# Patient Record
Sex: MALE | Race: WHITE | NOT HISPANIC OR LATINO | Employment: OTHER | URBAN - METROPOLITAN AREA
[De-identification: names, ages, dates, MRNs, and addresses within clinical notes are randomized per-mention and may not be internally consistent; named-entity substitution may affect disease eponyms.]

---

## 2023-05-02 ENCOUNTER — APPOINTMENT (OUTPATIENT)
Dept: LAB | Facility: LAB | Age: 73
End: 2023-05-02
Payer: MEDICARE

## 2023-05-02 LAB
ALBUMIN (G/DL) IN SER/PLAS: 4.4 G/DL (ref 3.4–5)
ANION GAP IN SER/PLAS: 13 MMOL/L (ref 10–20)
CALCIUM (MG/DL) IN SER/PLAS: 9.4 MG/DL (ref 8.6–10.6)
CARBON DIOXIDE, TOTAL (MMOL/L) IN SER/PLAS: 27 MMOL/L (ref 21–32)
CHLORIDE (MMOL/L) IN SER/PLAS: 107 MMOL/L (ref 98–107)
CREATININE (MG/DL) IN SER/PLAS: 2.38 MG/DL (ref 0.5–1.3)
GFR MALE: 28 ML/MIN/1.73M2
GLUCOSE (MG/DL) IN SER/PLAS: 93 MG/DL (ref 74–99)
PHOSPHATE (MG/DL) IN SER/PLAS: 4.1 MG/DL (ref 2.5–4.9)
POTASSIUM (MMOL/L) IN SER/PLAS: 5.5 MMOL/L (ref 3.5–5.3)
SODIUM (MMOL/L) IN SER/PLAS: 141 MMOL/L (ref 136–145)
UREA NITROGEN (MG/DL) IN SER/PLAS: 55 MG/DL (ref 6–23)

## 2023-08-01 ENCOUNTER — APPOINTMENT (OUTPATIENT)
Dept: LAB | Facility: LAB | Age: 73
End: 2023-08-01
Payer: MEDICARE

## 2023-08-01 LAB
ALBUMIN (G/DL) IN SER/PLAS: 4.6 G/DL (ref 3.4–5)
ANION GAP IN SER/PLAS: 13 MMOL/L (ref 10–20)
CALCIUM (MG/DL) IN SER/PLAS: 9.5 MG/DL (ref 8.6–10.6)
CARBON DIOXIDE, TOTAL (MMOL/L) IN SER/PLAS: 25 MMOL/L (ref 21–32)
CHLORIDE (MMOL/L) IN SER/PLAS: 106 MMOL/L (ref 98–107)
CREATININE (MG/DL) IN SER/PLAS: 2.09 MG/DL (ref 0.5–1.3)
ERYTHROCYTE DISTRIBUTION WIDTH (RATIO) BY AUTOMATED COUNT: 12.9 % (ref 11.5–14.5)
ERYTHROCYTE MEAN CORPUSCULAR HEMOGLOBIN CONCENTRATION (G/DL) BY AUTOMATED: 32.4 G/DL (ref 32–36)
ERYTHROCYTE MEAN CORPUSCULAR VOLUME (FL) BY AUTOMATED COUNT: 94 FL (ref 80–100)
ERYTHROCYTES (10*6/UL) IN BLOOD BY AUTOMATED COUNT: 4.55 X10E12/L (ref 4.5–5.9)
GFR MALE: 32 ML/MIN/1.73M2
GLUCOSE (MG/DL) IN SER/PLAS: 95 MG/DL (ref 74–99)
HEMATOCRIT (%) IN BLOOD BY AUTOMATED COUNT: 42.9 % (ref 41–52)
HEMOGLOBIN (G/DL) IN BLOOD: 13.9 G/DL (ref 13.5–17.5)
LEUKOCYTES (10*3/UL) IN BLOOD BY AUTOMATED COUNT: 6 X10E9/L (ref 4.4–11.3)
NRBC (PER 100 WBCS) BY AUTOMATED COUNT: 0 /100 WBC (ref 0–0)
PHOSPHATE (MG/DL) IN SER/PLAS: 4.8 MG/DL (ref 2.5–4.9)
PLATELETS (10*3/UL) IN BLOOD AUTOMATED COUNT: 195 X10E9/L (ref 150–450)
POTASSIUM (MMOL/L) IN SER/PLAS: 5.2 MMOL/L (ref 3.5–5.3)
SODIUM (MMOL/L) IN SER/PLAS: 139 MMOL/L (ref 136–145)
UREA NITROGEN (MG/DL) IN SER/PLAS: 60 MG/DL (ref 6–23)

## 2023-08-07 LAB
AMMONIUM, 24 HR, U(MAYO): 57 MMOL/24 H (ref 15–56)
BRUSHITE CRYSTAL(MAYO): -4.95 DG
CALCIUM OXALATE CRYSTAL(MAYO): 0.09 DG
CALCIUM, 24 HR, U(MAYO): 83 MG/24 H
CHLORIDE, U(MAYO): 200 MMOL/24 H (ref 34–286)
CITRATE EXCRETION, U(MAYO): 150 MG/24 H
COLLECTION DURATION(MAYO): 24 H
CREATININE, U(MAYO): 1958 MG/24 H (ref 930–2955)
HEIGHT (CM)(MAYO): 0 CM
HYDROXYAPATITE CRYSTAL(MAYO): -2.93 DG
INTERPRETATION(MAYO): ABNORMAL
MAGNESIUM, 24 HR, U(MAYO): 167 MG/24 H (ref 51–269)
OSMOLALITY(MAYO): 442 MOSM/KG (ref 150–1150)
OXALATE, MG/24 H(MAYO): 59 MG/24 H (ref 9.7–40.5)
OXALATE, U (MMOL/24 H)(MAYO): 0.67 MMOL/24 H (ref 0.11–0.46)
PATIENT SURFACE AREA(MAYO): ABNORMAL 1.73M(2)
PH, U(MAYO): 5 (ref 4.5–8)
PHOSPHORUS, U(MAYO): 1500 MG/24 H (ref 226–1797)
POTASSIUM, U(MAYO): 133 MMOL/24 H (ref 16–105)
PROTEIN CATABOLIC RATE, U(MAYO): 188 G/24 H (ref 56–125)
SODIUM, U(MAYO): 217 MMOL/24 H (ref 22–328)
SULFATE, 24 HR, U(MAYO): 44 MMOL/24 H (ref 7–47)
UREA NITROGEN, U(MAYO): 26 G/24 H (ref 7–42)
URIC ACID CRYSTAL(MAYO): -0.19 DG
URIC ACID, U(MAYO): 250 MG/24 H (ref 200–1000)
VOLUME(MAYO): 4167 ML
WEIGHT (KG)(MAYO): 0 KG

## 2023-11-03 ENCOUNTER — LAB (OUTPATIENT)
Dept: LAB | Facility: LAB | Age: 73
End: 2023-11-03
Payer: MEDICARE

## 2023-11-03 DIAGNOSIS — N18.4 CHRONIC KIDNEY DISEASE, STAGE 4 (SEVERE) (MULTI): Primary | ICD-10-CM

## 2023-11-03 LAB
ALBUMIN SERPL BCP-MCNC: 4.2 G/DL (ref 3.4–5)
ANION GAP SERPL CALC-SCNC: 12 MMOL/L (ref 10–20)
BUN SERPL-MCNC: 64 MG/DL (ref 6–23)
CALCIUM SERPL-MCNC: 9.7 MG/DL (ref 8.6–10.6)
CHLORIDE SERPL-SCNC: 106 MMOL/L (ref 98–107)
CO2 SERPL-SCNC: 28 MMOL/L (ref 21–32)
CREAT SERPL-MCNC: 2.17 MG/DL (ref 0.5–1.3)
CREAT UR-MCNC: 48.5 MG/DL (ref 20–370)
GFR SERPL CREATININE-BSD FRML MDRD: 31 ML/MIN/1.73M*2
GLUCOSE SERPL-MCNC: 99 MG/DL (ref 74–99)
MICROALBUMIN UR-MCNC: <7 MG/L
MICROALBUMIN/CREAT UR: NORMAL MG/G{CREAT}
PHOSPHATE SERPL-MCNC: 4 MG/DL (ref 2.5–4.9)
POTASSIUM SERPL-SCNC: 5.3 MMOL/L (ref 3.5–5.3)
SODIUM SERPL-SCNC: 141 MMOL/L (ref 136–145)

## 2023-11-03 PROCEDURE — 80069 RENAL FUNCTION PANEL: CPT

## 2023-11-03 PROCEDURE — 82570 ASSAY OF URINE CREATININE: CPT

## 2023-11-03 PROCEDURE — 82043 UR ALBUMIN QUANTITATIVE: CPT

## 2023-11-03 PROCEDURE — 36415 COLL VENOUS BLD VENIPUNCTURE: CPT

## 2024-02-02 ENCOUNTER — APPOINTMENT (OUTPATIENT)
Dept: LAB | Facility: LAB | Age: 74
End: 2024-02-02
Payer: MEDICARE

## 2024-02-02 ENCOUNTER — LAB (OUTPATIENT)
Dept: LAB | Facility: LAB | Age: 74
End: 2024-02-02
Payer: MEDICARE

## 2024-02-02 DIAGNOSIS — N18.4 CHRONIC KIDNEY DISEASE, STAGE 4 (SEVERE) (MULTI): ICD-10-CM

## 2024-02-02 DIAGNOSIS — N18.4 CHRONIC KIDNEY DISEASE, STAGE 4 (SEVERE) (MULTI): Primary | ICD-10-CM

## 2024-02-02 LAB
ALBUMIN SERPL BCP-MCNC: 4.3 G/DL (ref 3.4–5)
ANION GAP SERPL CALC-SCNC: 14 MMOL/L (ref 10–20)
BUN SERPL-MCNC: 58 MG/DL (ref 6–23)
CALCIUM SERPL-MCNC: 9.4 MG/DL (ref 8.6–10.6)
CHLORIDE SERPL-SCNC: 107 MMOL/L (ref 98–107)
CO2 SERPL-SCNC: 24 MMOL/L (ref 21–32)
CREAT SERPL-MCNC: 2.17 MG/DL (ref 0.5–1.3)
EGFRCR SERPLBLD CKD-EPI 2021: 31 ML/MIN/1.73M*2
GLUCOSE SERPL-MCNC: 117 MG/DL (ref 74–99)
PHOSPHATE SERPL-MCNC: 3.4 MG/DL (ref 2.5–4.9)
POTASSIUM SERPL-SCNC: 4.4 MMOL/L (ref 3.5–5.3)
SODIUM SERPL-SCNC: 141 MMOL/L (ref 136–145)

## 2024-02-02 PROCEDURE — 84392 ASSAY OF URINE SULFATE: CPT

## 2024-02-02 PROCEDURE — 84540 ASSAY OF URINE/UREA-N: CPT

## 2024-02-02 PROCEDURE — 82507 ASSAY OF CITRATE: CPT

## 2024-02-02 PROCEDURE — 84560 ASSAY OF URINE/URIC ACID: CPT

## 2024-02-02 PROCEDURE — 83945 ASSAY OF OXALATE: CPT

## 2024-02-02 PROCEDURE — 82436 ASSAY OF URINE CHLORIDE: CPT

## 2024-02-02 PROCEDURE — 84300 ASSAY OF URINE SODIUM: CPT

## 2024-02-02 PROCEDURE — 82340 ASSAY OF CALCIUM IN URINE: CPT

## 2024-02-02 PROCEDURE — 82570 ASSAY OF URINE CREATININE: CPT

## 2024-02-02 PROCEDURE — 83735 ASSAY OF MAGNESIUM: CPT

## 2024-02-02 PROCEDURE — 82140 ASSAY OF AMMONIA: CPT

## 2024-02-02 PROCEDURE — 80069 RENAL FUNCTION PANEL: CPT

## 2024-02-02 PROCEDURE — 84133 ASSAY OF URINE POTASSIUM: CPT

## 2024-02-02 PROCEDURE — 83986 ASSAY PH BODY FLUID NOS: CPT

## 2024-02-02 PROCEDURE — 83935 ASSAY OF URINE OSMOLALITY: CPT

## 2024-02-02 PROCEDURE — 36415 COLL VENOUS BLD VENIPUNCTURE: CPT

## 2024-02-08 LAB
AMMONIA 24H UR-SRATE: 35 MMOL/24 H (ref 15–56)
BRUSHITE CRYSTAL(MAYO): -4.45 DG
BSA: ABNORMAL 1.73M(2)
CALCIUM 24H UR-MRATE: 80 MG/24 H
CAOX 24H ENGDIFF UR: -0.54 DG
CHLORIDE 24H UR-SRATE: 156 MMOL/24 H (ref 34–286)
CITRATE 24H UR-MRATE: 204 MG/24 H
COLLECT DURATION TIME UR: 24 H
CREAT 24H UR-MRATE: 1560 MG/24 H (ref 930–2955)
HYDROXYAPATITE 24H ENGDIFF UR: -2.03 DG
MAGNESIUM 24H UR-MRATE: 160 MG/24 H (ref 51–269)
OSMOLALITY 24H UR: 429 MOSM/KG (ref 150–1150)
OXALATE 24H UR-MRATE: 31.7 MG/24 H (ref 9.7–40.5)
OXALATE 24H UR-SRATE: 0.36 MMOL/24 H (ref 0.11–0.46)
PH 24H UR: 5.2 [PH] (ref 4.5–8)
PHOSPHATE 24H UR-MRATE: 1280 MG/24 H (ref 226–1797)
POTASSIUM 24H UR-SRATE: 140 MMOL/24 H (ref 16–105)
PROTEIN CATABOLIC RATE 24H UR-MRATE: 180 G/24 H (ref 56–125)
SODIUM 24H UR-SRATE: 180 MMOL/24 H (ref 22–328)
SPECIMEN VOL 24H UR: 4000 ML
SULFATE 24H UR-SRATE: 37 MMOL/24 H (ref 7–47)
URATE 24H UR-MRATE: 320 MG/24 H (ref 200–1000)
URIC ACID CRYSTAL(MAYO): 0.12 DG
URINALYSIS SPECIALIST REVIEW: ABNORMAL
UUN 24H UR-MRATE: 24.8 G/24 H (ref 7–42)

## 2024-02-15 ENCOUNTER — OFFICE VISIT (OUTPATIENT)
Dept: NEPHROLOGY | Facility: CLINIC | Age: 74
End: 2024-02-15
Payer: MEDICARE

## 2024-02-15 VITALS
HEIGHT: 71 IN | SYSTOLIC BLOOD PRESSURE: 146 MMHG | HEART RATE: 66 BPM | BODY MASS INDEX: 24.5 KG/M2 | DIASTOLIC BLOOD PRESSURE: 73 MMHG | WEIGHT: 175 LBS

## 2024-02-15 DIAGNOSIS — N20.0 NEPHROLITHIASIS: ICD-10-CM

## 2024-02-15 DIAGNOSIS — N18.32 STAGE 3B CHRONIC KIDNEY DISEASE (MULTI): Primary | ICD-10-CM

## 2024-02-15 PROCEDURE — 1159F MED LIST DOCD IN RCRD: CPT | Performed by: INTERNAL MEDICINE

## 2024-02-15 PROCEDURE — 99214 OFFICE O/P EST MOD 30 MIN: CPT | Performed by: INTERNAL MEDICINE

## 2024-02-15 PROCEDURE — 1126F AMNT PAIN NOTED NONE PRSNT: CPT | Performed by: INTERNAL MEDICINE

## 2024-02-15 RX ORDER — COLD-HOT PACK
1 EACH MISCELLANEOUS 2 TIMES DAILY
COMMUNITY

## 2024-02-15 RX ORDER — DAPAGLIFLOZIN 5 MG/1
5 TABLET, FILM COATED ORAL DAILY
COMMUNITY

## 2024-02-15 RX ORDER — CHOLECALCIFEROL (VITAMIN D3) 25 MCG
1 TABLET ORAL DAILY
COMMUNITY

## 2024-02-15 RX ORDER — PRAVASTATIN SODIUM 40 MG/1
40 TABLET ORAL NIGHTLY
COMMUNITY
Start: 2020-09-02

## 2024-02-15 RX ORDER — LOSARTAN POTASSIUM 25 MG/1
25 TABLET ORAL DAILY
COMMUNITY

## 2024-02-15 RX ORDER — TAMSULOSIN HYDROCHLORIDE 0.4 MG/1
0.4 CAPSULE ORAL DAILY
COMMUNITY
Start: 2020-09-02

## 2024-02-15 ASSESSMENT — ENCOUNTER SYMPTOMS
OCCASIONAL FEELINGS OF UNSTEADINESS: 0
DEPRESSION: 0
LOSS OF SENSATION IN FEET: 1

## 2024-02-15 ASSESSMENT — PAIN SCALES - GENERAL: PAINLEVEL: 0-NO PAIN

## 2024-02-15 NOTE — PROGRESS NOTES
" Leonel Mendez is a 73 y.o. male here in follow up for CKD    Doing well. Checks home BP periodically and gets good readings. Sees Dr. Amos every 3 months or so. Just completed another 24 h urine supersaturation which looks excellent, and we reviewed in the office today.  Specifically he denies dysuria, hematuria, flank pain, fevers,  leg swelling. His medications remain unchanged. BP here today higher than in PCP's office, for unclear reasons. We discussed checking it at home consistently for a few days, and if above goal will increase Farxiga to 10 mg daily (from current 5 mg daily).     ROS  All the rest reviewed and negative    Objective     Vital signs    /73 (BP Location: Right arm, Patient Position: Sitting, BP Cuff Size: Adult)   Pulse 66   Ht 1.803 m (5' 11\")   Wt 79.4 kg (175 lb)   BMI 24.41 kg/m²     Physical Exam  Constitutional:  well appearing, in NAD  Psychiatric:  appropriate mood and behavior    HEENT: NC/AT, clear sclerae, moist mucous membranes  Cardiovascular: normal S1, S2, RRR,  no murmurs, gallop or rubs  Pulmonary:  Normal breath sounds  Extremities: no edema  Skin:  warm and dry    Meds    Current Outpatient Medications:     aspirin 81 mg capsule, Take 81 mg by mouth once daily., Disp: , Rfl:     cholecalciferol (Vitamin D-3) 25 MCG (1000 UT) tablet, Take 1 tablet (25 mcg) by mouth once daily., Disp: , Rfl:     cranberry conc-C-bacillus coag (Cranberry Urinary Tract Health) 250-30-3.5 mg tablet, Take 1 tablet by mouth 2 times a day., Disp: , Rfl:     Farxiga 5 mg, Take 1 tablet (5 mg) by mouth once daily., Disp: , Rfl:     losartan (Cozaar) 25 mg tablet, Take 1 tablet (25 mg) by mouth once daily., Disp: , Rfl:     pravastatin (Pravachol) 40 mg tablet, Take 1 tablet (40 mg) by mouth once daily at bedtime., Disp: , Rfl:     tamsulosin (Flomax) 0.4 mg 24 hr capsule, Take 1 capsule (0.4 mg) by mouth once daily., Disp: , Rfl:      Allergies  Allergies   Allergen Reactions    Erythromycin " Hives    Pollen Extracts Unknown        Results  Component      Latest Ref Rng 11/7/2022 2/7/2023 8/1/2023 2/2/2024   Volume      mL 3,801.00  4,001.00  4,167.00  4000    Sodium, U      22 - 328 mmol/24 h 144  176  217  180    Potassium, U      16 - 105 mmol/24 h 103  132 (H)  133 (H)  140 (H)    Calcium, 24 Hr, U      <250 mg/24 h <38  <40  83  80    Citrate Excretion, U      mg/24 h 198  172  150  204    Oxalate, mg/24 h      9.7 - 40.5 mg/24 h 40.5  56.3 (H)  59.0 (H)  31.7    pH, U      4.5 - 8.0  5.2  5.2  5.0  5.2    Uric Acid, U      200 - 1000 mg/24 h 456  360  250  320    Creatinine, U      930 - 2955 mg/24 h 1,558  1,480  1,958  1560      Component      Latest Ref Rng 2/7/2023 5/2/2023 8/1/2023 11/3/2023 2/2/2024   Blood Urea Nitrogen      6 - 23 mg/dL 64 (H)  55 (H)  60 (H)  64 (H)  58 (H)    Creatinine      0.50 - 1.30 mg/dL 2.14 (H)  2.38 (H)  2.09 (H)  2.17 (H)  2.17 (H)       Imaging results  No results found.     Assessment and Plan  1. CKD stage G4/A1, presumed to represent past obstructive nephropathy +/- hyperoxaluria. Diabetic kidney disease unlikely. With stable/mildly improved s.creatinine and in the absence of proteinuria we're withholding renal biopsy. Labs this month stable. Will decrease frequency of blood and urine tests to every 6 months.     2. H/o bladder stones. Recently reported passing small amount of gravel in the urine. This month's supersaturation reveals much improved oxalate excretion and slight ly better citrate . Continue large amount of fluid intake, crystal light lemonade as able, dietary restrictions as you are doing. Repeat supersaturation in August.     3. HTN - BP above goal in the office today again. Continue current antihypertensive regimen, but check home BP am and pm for 5-7 days and report readings to me or Dr. Amos. If SBP> 130 would like to increase Farxiga to 10 mg daily (recently reported positive results on rx of nephrolithiasis as well).     RTC in f/u in 6  months.    Minerva Martin MD

## 2024-02-15 NOTE — PATIENT INSTRUCTIONS
Continue medications as you are taking  Try to exercise or at least walk regularly, as able    The blood pressure goal for kidney patients is 130s/70-80s or less.  Good blood pressure control at home is very important for your health.  Therefore please check home blood pressure morning and evening 5-7 days and send measurements to us one of the following ways:  - My chart  - Email: [email or web address]  - Phone  or  - Fax     Avoid kidney toxic medications, like nonsteroidal anti-inflammatories (NSAIDs); Tylenol or acetaminophen over the counter is okay    See you back in follow up in 6 months  Please obtain lab test a few days before the next visit.

## 2024-08-29 ENCOUNTER — APPOINTMENT (OUTPATIENT)
Dept: PRIMARY CARE | Facility: CLINIC | Age: 74
End: 2024-08-29
Payer: MEDICARE

## 2024-08-29 VITALS
BODY MASS INDEX: 24.53 KG/M2 | HEART RATE: 73 BPM | TEMPERATURE: 97.5 F | OXYGEN SATURATION: 100 % | HEIGHT: 71 IN | WEIGHT: 175.2 LBS | DIASTOLIC BLOOD PRESSURE: 80 MMHG | SYSTOLIC BLOOD PRESSURE: 120 MMHG

## 2024-08-29 DIAGNOSIS — I10 BENIGN ESSENTIAL HYPERTENSION: ICD-10-CM

## 2024-08-29 DIAGNOSIS — E72.53: ICD-10-CM

## 2024-08-29 DIAGNOSIS — E11.65 TYPE 2 DIABETES MELLITUS WITH HYPERGLYCEMIA, UNSPECIFIED WHETHER LONG TERM INSULIN USE (MULTI): Primary | ICD-10-CM

## 2024-08-29 DIAGNOSIS — N18.4 CKD (CHRONIC KIDNEY DISEASE) STAGE 4, GFR 15-29 ML/MIN (MULTI): ICD-10-CM

## 2024-08-29 LAB — POC HEMOGLOBIN A1C: 6.3 % (ref 4.2–6.5)

## 2024-08-29 PROCEDURE — 1160F RVW MEDS BY RX/DR IN RCRD: CPT | Performed by: FAMILY MEDICINE

## 2024-08-29 PROCEDURE — 99203 OFFICE O/P NEW LOW 30 MIN: CPT | Performed by: FAMILY MEDICINE

## 2024-08-29 PROCEDURE — 83036 HEMOGLOBIN GLYCOSYLATED A1C: CPT | Performed by: FAMILY MEDICINE

## 2024-08-29 PROCEDURE — 1159F MED LIST DOCD IN RCRD: CPT | Performed by: FAMILY MEDICINE

## 2024-08-29 PROCEDURE — 1158F ADVNC CARE PLAN TLK DOCD: CPT | Performed by: FAMILY MEDICINE

## 2024-08-29 PROCEDURE — 3074F SYST BP LT 130 MM HG: CPT | Performed by: FAMILY MEDICINE

## 2024-08-29 PROCEDURE — 1126F AMNT PAIN NOTED NONE PRSNT: CPT | Performed by: FAMILY MEDICINE

## 2024-08-29 PROCEDURE — 1036F TOBACCO NON-USER: CPT | Performed by: FAMILY MEDICINE

## 2024-08-29 PROCEDURE — 1123F ACP DISCUSS/DSCN MKR DOCD: CPT | Performed by: FAMILY MEDICINE

## 2024-08-29 PROCEDURE — 3079F DIAST BP 80-89 MM HG: CPT | Performed by: FAMILY MEDICINE

## 2024-08-29 ASSESSMENT — ENCOUNTER SYMPTOMS
DEPRESSION: 0
LOSS OF SENSATION IN FEET: 0
OCCASIONAL FEELINGS OF UNSTEADINESS: 0

## 2024-08-29 ASSESSMENT — PATIENT HEALTH QUESTIONNAIRE - PHQ9
2. FEELING DOWN, DEPRESSED OR HOPELESS: NOT AT ALL
1. LITTLE INTEREST OR PLEASURE IN DOING THINGS: NOT AT ALL
SUM OF ALL RESPONSES TO PHQ9 QUESTIONS 1 AND 2: 0

## 2024-08-29 ASSESSMENT — PAIN SCALES - GENERAL: PAINLEVEL: 0-NO PAIN

## 2024-08-29 NOTE — ASSESSMENT & PLAN NOTE
Well controlled with A1C of 6.3  Labs reviewed and overall up to date  Will add lipids to next blood draw  Follow up in 3 months

## 2024-08-29 NOTE — PROGRESS NOTES
"Subjective   Patient ID: Leonel Mendez is a 74 y.o. male who presents for Establish Care.    HPI   Presents to [facility] care.  Former patient of Dr. Amos.  States that established care in 2019 and at that time was found to have DMII.  Shortly afterwards was noted to have worsening renal function.  Has since followed with  nephrology and renal function has been stable.    States that he does not monitor glucose levels and generally controlled.  Denies any urinary frequency, increased thirst.    Review of Systems  Negative unless noted in HPI    Objective   /80 (BP Location: Right arm, Patient Position: Sitting, BP Cuff Size: Large adult)   Pulse 73   Temp 36.4 °C (97.5 °F) (Temporal)   Ht 1.803 m (5' 11\")   Wt 79.5 kg (175 lb 3.2 oz)   SpO2 100%   BMI 24.44 kg/m²     Physical Exam  Constitutional:       Appearance: Normal appearance.   Cardiovascular:      Rate and Rhythm: Normal rate and regular rhythm.   Pulmonary:      Effort: Pulmonary effort is normal.      Breath sounds: Normal breath sounds.   Neurological:      Mental Status: He is alert and oriented to person, place, and time.   Psychiatric:         Mood and Affect: Mood normal.         Assessment/Plan   Problem List Items Addressed This Visit             ICD-10-CM    Primary hyperoxaluria (Multi) E72.53     Follows with nephrology for monitoring         CKD (chronic kidney disease) stage 4, GFR 15-29 ml/min (Multi) N18.4     Stable renal function, continue to follow up and avoid nephrotoxic agents         Type 2 diabetes mellitus with hyperglycemia (Multi) - Primary E11.65     Well controlled with A1C of 6.3  Labs reviewed and overall up to date  Will add lipids to next blood draw  Follow up in 3 months         Relevant Orders    POCT glycosylated hemoglobin (Hb A1C) manually resulted (Completed)    Benign essential hypertension I10     Well controlled  Continue current regimen               "

## 2024-10-22 ENCOUNTER — LAB (OUTPATIENT)
Dept: LAB | Facility: LAB | Age: 74
End: 2024-10-22
Payer: MEDICARE

## 2024-10-22 DIAGNOSIS — N18.32 STAGE 3B CHRONIC KIDNEY DISEASE (MULTI): ICD-10-CM

## 2024-10-22 DIAGNOSIS — N20.0 NEPHROLITHIASIS: ICD-10-CM

## 2024-10-22 LAB
25(OH)D3 SERPL-MCNC: 38 NG/ML (ref 30–100)
ALBUMIN SERPL BCP-MCNC: 4.6 G/DL (ref 3.4–5)
ANION GAP SERPL CALC-SCNC: 16 MMOL/L (ref 10–20)
APPEARANCE UR: CLEAR
BILIRUB UR STRIP.AUTO-MCNC: NEGATIVE MG/DL
BUN SERPL-MCNC: 56 MG/DL (ref 6–23)
CALCIUM SERPL-MCNC: 9.6 MG/DL (ref 8.6–10.6)
CHLORIDE SERPL-SCNC: 105 MMOL/L (ref 98–107)
CO2 SERPL-SCNC: 23 MMOL/L (ref 21–32)
COLOR UR: ABNORMAL
CREAT SERPL-MCNC: 1.96 MG/DL (ref 0.5–1.3)
CREAT UR-MCNC: 51.8 MG/DL (ref 20–370)
EGFRCR SERPLBLD CKD-EPI 2021: 35 ML/MIN/1.73M*2
ERYTHROCYTE [DISTWIDTH] IN BLOOD BY AUTOMATED COUNT: 11.9 % (ref 11.5–14.5)
GLUCOSE SERPL-MCNC: 100 MG/DL (ref 74–99)
GLUCOSE UR STRIP.AUTO-MCNC: ABNORMAL MG/DL
HCT VFR BLD AUTO: 46.4 % (ref 41–52)
HGB BLD-MCNC: 15.5 G/DL (ref 13.5–17.5)
KETONES UR STRIP.AUTO-MCNC: NEGATIVE MG/DL
LEUKOCYTE ESTERASE UR QL STRIP.AUTO: NEGATIVE
MCH RBC QN AUTO: 30.5 PG (ref 26–34)
MCHC RBC AUTO-ENTMCNC: 33.4 G/DL (ref 32–36)
MCV RBC AUTO: 91 FL (ref 80–100)
MICROALBUMIN UR-MCNC: <7 MG/L
MICROALBUMIN/CREAT UR: NORMAL MG/G{CREAT}
NITRITE UR QL STRIP.AUTO: NEGATIVE
NRBC BLD-RTO: 0 /100 WBCS (ref 0–0)
PH UR STRIP.AUTO: 5 [PH]
PHOSPHATE SERPL-MCNC: 3.9 MG/DL (ref 2.5–4.9)
PLATELET # BLD AUTO: 224 X10*3/UL (ref 150–450)
POTASSIUM SERPL-SCNC: 4.9 MMOL/L (ref 3.5–5.3)
PROT UR STRIP.AUTO-MCNC: NEGATIVE MG/DL
PTH-INTACT SERPL-MCNC: 64.2 PG/ML (ref 18.5–88)
RBC # BLD AUTO: 5.08 X10*6/UL (ref 4.5–5.9)
RBC # UR STRIP.AUTO: NEGATIVE /UL
SODIUM SERPL-SCNC: 139 MMOL/L (ref 136–145)
SP GR UR STRIP.AUTO: 1.01
UROBILINOGEN UR STRIP.AUTO-MCNC: NORMAL MG/DL
WBC # BLD AUTO: 5.3 X10*3/UL (ref 4.4–11.3)

## 2024-10-22 PROCEDURE — 82507 ASSAY OF CITRATE: CPT

## 2024-10-22 PROCEDURE — 83986 ASSAY PH BODY FLUID NOS: CPT

## 2024-10-22 PROCEDURE — 82140 ASSAY OF AMMONIA: CPT

## 2024-10-22 PROCEDURE — 82306 VITAMIN D 25 HYDROXY: CPT

## 2024-10-22 PROCEDURE — 82570 ASSAY OF URINE CREATININE: CPT

## 2024-10-22 PROCEDURE — 84392 ASSAY OF URINE SULFATE: CPT

## 2024-10-22 PROCEDURE — 36415 COLL VENOUS BLD VENIPUNCTURE: CPT

## 2024-10-22 PROCEDURE — 84300 ASSAY OF URINE SODIUM: CPT

## 2024-10-22 PROCEDURE — 82436 ASSAY OF URINE CHLORIDE: CPT

## 2024-10-22 PROCEDURE — 83935 ASSAY OF URINE OSMOLALITY: CPT

## 2024-10-22 PROCEDURE — 84133 ASSAY OF URINE POTASSIUM: CPT

## 2024-10-22 PROCEDURE — 82340 ASSAY OF CALCIUM IN URINE: CPT

## 2024-10-22 PROCEDURE — 81003 URINALYSIS AUTO W/O SCOPE: CPT

## 2024-10-22 PROCEDURE — 83735 ASSAY OF MAGNESIUM: CPT

## 2024-10-22 PROCEDURE — 85027 COMPLETE CBC AUTOMATED: CPT

## 2024-10-22 PROCEDURE — 82043 UR ALBUMIN QUANTITATIVE: CPT

## 2024-10-22 PROCEDURE — 83970 ASSAY OF PARATHORMONE: CPT

## 2024-10-22 PROCEDURE — 80069 RENAL FUNCTION PANEL: CPT

## 2024-10-22 PROCEDURE — 83945 ASSAY OF OXALATE: CPT

## 2024-10-22 PROCEDURE — 84540 ASSAY OF URINE/UREA-N: CPT

## 2024-10-22 PROCEDURE — 84560 ASSAY OF URINE/URIC ACID: CPT

## 2024-10-28 LAB
AMMONIA 24H UR-SRATE: 37 MMOL/24 H (ref 15–56)
BRUSHITE CRYSTAL(MAYO): -5.62 DG
BSA: ABNORMAL 1.73M(2)
CALCIUM 24H UR-MRATE: 60 MG/24 H
CAOX 24H ENGDIFF UR: -0.22 DG
CHLORIDE 24H UR-SRATE: 111 MMOL/24 H (ref 34–286)
CITRATE 24H UR-MRATE: 231 MG/24 H
COLLECT DURATION TIME UR: 24 H
CREAT 24H UR-MRATE: 1590 MG/24 H (ref 930–2955)
HYDROXYAPATITE 24H ENGDIFF UR: -3.63 DG
MAGNESIUM 24H UR-MRATE: 120 MG/24 H (ref 51–269)
OSMOLALITY 24H UR: 461 MOSM/KG (ref 150–1150)
OXALATE 24H UR-MRATE: 31.7 MG/24 H (ref 9.7–40.5)
OXALATE 24H UR-SRATE: 0.36 MMOL/24 H (ref 0.11–0.46)
PH 24H UR: 4.9 [PH] (ref 4.5–8)
PHOSPHATE 24H UR-MRATE: 780 MG/24 H (ref 226–1797)
POTASSIUM 24H UR-SRATE: 135 MMOL/24 H (ref 16–105)
PROTEIN CATABOLIC RATE 24H UR-MRATE: 163 G/24 H (ref 56–125)
SODIUM 24H UR-SRATE: 87 MMOL/24 H (ref 22–328)
SPECIMEN VOL 24H UR: 3000 ML
SULFATE 24H UR-SRATE: 31 MMOL/24 H (ref 7–47)
URATE 24H UR-MRATE: 330 MG/24 H (ref 200–1000)
URIC ACID CRYSTAL(MAYO): 1.56 DG
URINALYSIS SPECIALIST REVIEW: ABNORMAL
UUN 24H UR-MRATE: 22.1 G/24 H (ref 7–42)

## 2024-10-30 ENCOUNTER — APPOINTMENT (OUTPATIENT)
Dept: NEPHROLOGY | Facility: CLINIC | Age: 74
End: 2024-10-30
Payer: MEDICARE

## 2024-10-30 VITALS
WEIGHT: 175 LBS | BODY MASS INDEX: 24.41 KG/M2 | DIASTOLIC BLOOD PRESSURE: 66 MMHG | SYSTOLIC BLOOD PRESSURE: 124 MMHG | HEART RATE: 75 BPM

## 2024-10-30 DIAGNOSIS — N18.4 CKD (CHRONIC KIDNEY DISEASE) STAGE 4, GFR 15-29 ML/MIN (MULTI): ICD-10-CM

## 2024-10-30 DIAGNOSIS — N20.0 NEPHROLITHIASIS: Primary | ICD-10-CM

## 2024-10-30 PROCEDURE — 3074F SYST BP LT 130 MM HG: CPT | Performed by: INTERNAL MEDICINE

## 2024-10-30 PROCEDURE — 1036F TOBACCO NON-USER: CPT | Performed by: INTERNAL MEDICINE

## 2024-10-30 PROCEDURE — 3078F DIAST BP <80 MM HG: CPT | Performed by: INTERNAL MEDICINE

## 2024-10-30 PROCEDURE — G2211 COMPLEX E/M VISIT ADD ON: HCPCS | Performed by: INTERNAL MEDICINE

## 2024-10-30 PROCEDURE — 1159F MED LIST DOCD IN RCRD: CPT | Performed by: INTERNAL MEDICINE

## 2024-10-30 PROCEDURE — 99213 OFFICE O/P EST LOW 20 MIN: CPT | Performed by: INTERNAL MEDICINE

## 2024-10-30 PROCEDURE — 1123F ACP DISCUSS/DSCN MKR DOCD: CPT | Performed by: INTERNAL MEDICINE

## 2024-12-05 ENCOUNTER — APPOINTMENT (OUTPATIENT)
Dept: PRIMARY CARE | Facility: CLINIC | Age: 74
End: 2024-12-05
Payer: MEDICARE

## 2024-12-13 ENCOUNTER — APPOINTMENT (OUTPATIENT)
Dept: PRIMARY CARE | Facility: CLINIC | Age: 74
End: 2024-12-13
Payer: MEDICARE

## 2024-12-13 VITALS
BODY MASS INDEX: 24.95 KG/M2 | HEIGHT: 71 IN | DIASTOLIC BLOOD PRESSURE: 68 MMHG | WEIGHT: 178.2 LBS | OXYGEN SATURATION: 99 % | SYSTOLIC BLOOD PRESSURE: 140 MMHG | TEMPERATURE: 97.7 F | HEART RATE: 77 BPM

## 2024-12-13 DIAGNOSIS — N18.4 CKD (CHRONIC KIDNEY DISEASE) STAGE 4, GFR 15-29 ML/MIN (MULTI): ICD-10-CM

## 2024-12-13 DIAGNOSIS — E11.65 TYPE 2 DIABETES MELLITUS WITH HYPERGLYCEMIA, UNSPECIFIED WHETHER LONG TERM INSULIN USE (MULTI): ICD-10-CM

## 2024-12-13 DIAGNOSIS — I10 BENIGN ESSENTIAL HYPERTENSION: Primary | ICD-10-CM

## 2024-12-13 PROBLEM — N13.8 BPH WITH OBSTRUCTION/LOWER URINARY TRACT SYMPTOMS: Status: ACTIVE | Noted: 2024-12-13

## 2024-12-13 PROBLEM — E11.9 TYPE 2 DIABETES MELLITUS WITHOUT COMPLICATION, WITHOUT LONG-TERM CURRENT USE OF INSULIN (MULTI): Status: ACTIVE | Noted: 2019-05-02

## 2024-12-13 PROBLEM — N21.0 BLADDER CALCULUS: Status: ACTIVE | Noted: 2024-12-13

## 2024-12-13 PROBLEM — N40.1 BPH WITH OBSTRUCTION/LOWER URINARY TRACT SYMPTOMS: Status: ACTIVE | Noted: 2024-12-13

## 2024-12-13 PROBLEM — R33.9 URINARY RETENTION: Status: ACTIVE | Noted: 2022-01-24

## 2024-12-13 PROBLEM — R31.0 GROSS HEMATURIA: Status: ACTIVE | Noted: 2024-12-13

## 2024-12-13 PROBLEM — N28.9 RENAL INSUFFICIENCY: Status: ACTIVE | Noted: 2024-12-13

## 2024-12-13 PROBLEM — R31.0 GROSS HEMATURIA: Status: RESOLVED | Noted: 2024-12-13 | Resolved: 2024-12-13

## 2024-12-13 PROBLEM — R97.20 HIGH PROSTATE SPECIFIC ANTIGEN (PSA): Status: ACTIVE | Noted: 2024-12-13

## 2024-12-13 PROBLEM — N40.2 PROSTATE NODULE: Status: ACTIVE | Noted: 2024-12-13

## 2024-12-13 LAB — POC HEMOGLOBIN A1C: 6.4 % (ref 4.2–6.5)

## 2024-12-13 PROCEDURE — 3077F SYST BP >= 140 MM HG: CPT | Performed by: FAMILY MEDICINE

## 2024-12-13 PROCEDURE — 1160F RVW MEDS BY RX/DR IN RCRD: CPT | Performed by: FAMILY MEDICINE

## 2024-12-13 PROCEDURE — 1123F ACP DISCUSS/DSCN MKR DOCD: CPT | Performed by: FAMILY MEDICINE

## 2024-12-13 PROCEDURE — 1036F TOBACCO NON-USER: CPT | Performed by: FAMILY MEDICINE

## 2024-12-13 PROCEDURE — 99214 OFFICE O/P EST MOD 30 MIN: CPT | Performed by: FAMILY MEDICINE

## 2024-12-13 PROCEDURE — 83036 HEMOGLOBIN GLYCOSYLATED A1C: CPT | Performed by: FAMILY MEDICINE

## 2024-12-13 PROCEDURE — 3078F DIAST BP <80 MM HG: CPT | Performed by: FAMILY MEDICINE

## 2024-12-13 PROCEDURE — 1159F MED LIST DOCD IN RCRD: CPT | Performed by: FAMILY MEDICINE

## 2024-12-13 PROCEDURE — G2211 COMPLEX E/M VISIT ADD ON: HCPCS | Performed by: FAMILY MEDICINE

## 2024-12-13 ASSESSMENT — PATIENT HEALTH QUESTIONNAIRE - PHQ9
SUM OF ALL RESPONSES TO PHQ9 QUESTIONS 1 AND 2: 0
1. LITTLE INTEREST OR PLEASURE IN DOING THINGS: NOT AT ALL
2. FEELING DOWN, DEPRESSED OR HOPELESS: NOT AT ALL

## 2024-12-13 ASSESSMENT — ENCOUNTER SYMPTOMS
DEPRESSION: 0
LOSS OF SENSATION IN FEET: 0
OCCASIONAL FEELINGS OF UNSTEADINESS: 0

## 2024-12-13 NOTE — PROGRESS NOTES
"Subjective   Patient ID: Leonel Mendez is a 74 y.o. male who presents for 3 month follow up, Hyperoxaluria, Chronic Kidney Disease, Diabetes, and Hypertension.    HPI   DMII: eating well, slightly less active then usual  Htn: taking medication as prescribed, home pressures are good, no chest pain or SOB  CKD: sees nephrology routinely and currently stable    Review of Systems  Negative unless noted in HPI    Objective   /68   Pulse 77   Temp 36.5 °C (97.7 °F) (Temporal)   Ht 1.803 m (5' 11\")   Wt 80.8 kg (178 lb 3.2 oz)   SpO2 99%   BMI 24.85 kg/m²     Physical Exam  Constitutional:       Appearance: He is normal weight.   Eyes:      Extraocular Movements: Extraocular movements intact.      Pupils: Pupils are equal, round, and reactive to light.   Cardiovascular:      Rate and Rhythm: Normal rate and regular rhythm.   Pulmonary:      Effort: Pulmonary effort is normal.      Breath sounds: Normal breath sounds.   Neurological:      General: No focal deficit present.      Mental Status: He is alert and oriented to person, place, and time.   Psychiatric:         Mood and Affect: Mood normal.         Assessment/Plan   Problem List Items Addressed This Visit             ICD-10-CM    CKD (chronic kidney disease) stage 4, GFR 15-29 ml/min (Multi) N18.4     Follows with nephrology routinely  Labs are stable         Type 2 diabetes mellitus with hyperglycemia (Multi) E11.65     A1C 6.4  Mild increase but still in good control  Continue farxiga  We discussed diet and exercise, was slightly less active therefore will return to mobility  Follow up in 3-4 months         Relevant Orders    POCT glycosylated hemoglobin (Hb A1C) manually resulted (Completed)    Benign essential hypertension - Primary I10     Well controlled, continue current regimen  Labs up to date               "

## 2024-12-13 NOTE — ASSESSMENT & PLAN NOTE
A1C 6.4  Mild increase but still in good control  Continue farxiga  We discussed diet and exercise, was slightly less active therefore will return to mobility  Follow up in 3-4 months

## 2025-03-14 ENCOUNTER — APPOINTMENT (OUTPATIENT)
Dept: PRIMARY CARE | Facility: CLINIC | Age: 75
End: 2025-03-14
Payer: MEDICARE

## 2025-03-14 VITALS
OXYGEN SATURATION: 98 % | HEART RATE: 85 BPM | TEMPERATURE: 97.9 F | BODY MASS INDEX: 24.56 KG/M2 | HEIGHT: 71 IN | SYSTOLIC BLOOD PRESSURE: 138 MMHG | WEIGHT: 175.4 LBS | DIASTOLIC BLOOD PRESSURE: 84 MMHG

## 2025-03-14 DIAGNOSIS — I10 BENIGN ESSENTIAL HYPERTENSION: ICD-10-CM

## 2025-03-14 DIAGNOSIS — E11.65 TYPE 2 DIABETES MELLITUS WITH HYPERGLYCEMIA, WITHOUT LONG-TERM CURRENT USE OF INSULIN: ICD-10-CM

## 2025-03-14 DIAGNOSIS — Z13.220 SCREENING FOR HYPERLIPIDEMIA: ICD-10-CM

## 2025-03-14 DIAGNOSIS — Z12.11 SCREEN FOR COLON CANCER: ICD-10-CM

## 2025-03-14 DIAGNOSIS — E72.53: ICD-10-CM

## 2025-03-14 DIAGNOSIS — N18.4 CKD (CHRONIC KIDNEY DISEASE) STAGE 4, GFR 15-29 ML/MIN (MULTI): ICD-10-CM

## 2025-03-14 DIAGNOSIS — Z23 NEED FOR PNEUMOCOCCAL 20-VALENT CONJUGATE VACCINATION: ICD-10-CM

## 2025-03-14 DIAGNOSIS — Z00.00 ROUTINE GENERAL MEDICAL EXAMINATION AT HEALTH CARE FACILITY: Primary | ICD-10-CM

## 2025-03-14 PROBLEM — R33.9 URINARY RETENTION: Status: RESOLVED | Noted: 2022-01-24 | Resolved: 2025-03-14

## 2025-03-14 LAB — POC HEMOGLOBIN A1C: 6.6 % (ref 4.2–6.5)

## 2025-03-14 ASSESSMENT — ACTIVITIES OF DAILY LIVING (ADL)
MANAGING_FINANCES: INDEPENDENT
BATHING: INDEPENDENT
GROCERY_SHOPPING: INDEPENDENT
TAKING_MEDICATION: INDEPENDENT
DRESSING: INDEPENDENT
DOING_HOUSEWORK: INDEPENDENT

## 2025-03-14 ASSESSMENT — ENCOUNTER SYMPTOMS
LOSS OF SENSATION IN FEET: 0
OCCASIONAL FEELINGS OF UNSTEADINESS: 0
DEPRESSION: 0

## 2025-03-14 NOTE — ASSESSMENT & PLAN NOTE
Mild increase in A1C, likely related to activity  Continue to work on active lifestyle, recheck in 3 months  Orders:    POCT glycosylated hemoglobin (Hb A1C) manually resulted    Albumin-Creatinine Ratio, Urine Random

## 2025-03-14 NOTE — PROGRESS NOTES
"Subjective   Reason for Visit: Leonel Mendez is an 74 y.o. male here for a Medicare Wellness visit.     Past Medical, Surgical, and Family History reviewed and updated in chart.    Reviewed all medications by prescribing practitioner or clinical pharmacist (such as prescriptions, OTCs, herbal therapies and supplements) and documented in the medical record.    HPI  DMII: taking Farxiga as prescribed, does report same diet but maybe less activity during winter months    HTN: no chest pain or SOB, taking medication as prescribed    HLD: takes medication as prescribed    CKDVI: follows with nephrology, stable  Patient Care Team:  Itzel Sexton MD as PCP - General (Family Medicine)  Itzel Sexton MD as PCP - tna Medicare Advantage PCP     Review of Systems  Negative unless noted in HPI    Objective   Vitals:  /84 (BP Location: Left arm, Patient Position: Sitting, BP Cuff Size: Adult)   Pulse 85   Temp 36.6 °C (97.9 °F) (Temporal)   Ht 1.803 m (5' 11\")   Wt 79.6 kg (175 lb 6.4 oz)   SpO2 98%   BMI 24.46 kg/m²       Physical Exam  Constitutional:       Appearance: Normal appearance.   HENT:      Head: Normocephalic and atraumatic.      Right Ear: External ear normal.      Left Ear: External ear normal.      Nose: Nose normal.   Eyes:      Extraocular Movements: Extraocular movements intact.      Conjunctiva/sclera: Conjunctivae normal.      Pupils: Pupils are equal, round, and reactive to light.   Cardiovascular:      Rate and Rhythm: Normal rate and regular rhythm.   Pulmonary:      Effort: Pulmonary effort is normal.      Breath sounds: Normal breath sounds.   Musculoskeletal:         General: Normal range of motion.      Cervical back: Neck supple.   Skin:     General: Skin is warm.   Neurological:      Mental Status: He is alert and oriented to person, place, and time. Mental status is at baseline.   Psychiatric:         Mood and Affect: Mood normal.         Assessment & Plan  Routine general medical " examination at health care facility  Recommended screening guidelines addressed and orders placed as indicated by age and chronic conditions  Screening labs ordered, will call with results  FIT test ordered  Prevnar 20 today  Continue to work on healthy lifestyle including well balanced diet, regular activity, limit alcohol, no tobacco products   Follow up annually      Orders:    1 Year Follow Up In [facility] - PCP - Wellness Exam; Future    Type 2 diabetes mellitus with hyperglycemia, without long-term current use of insulin  Mild increase in A1C, likely related to activity  Continue to work on active lifestyle, recheck in 3 months  Orders:    POCT glycosylated hemoglobin (Hb A1C) manually resulted    Albumin-Creatinine Ratio, Urine Random    Need for pneumococcal 20-valent conjugate vaccination    Orders:    Pneumococcal conjugate vaccine, 20-valent (PREVNAR 20)    Screen for colon cancer    Orders:    FIT (FOBT Immunoassay); Future    Screening for hyperlipidemia    Orders:    Lipid Panel; Future    Benign essential hypertension  Well controlled on current regimen  Labs done with nephrology, stable         CKD (chronic kidney disease) stage 4, GFR 15-29 ml/min (Multi)  Follows routinely with nephrology         Primary hyperoxaluria (Multi)  Follows with nephrology for management

## 2025-03-14 NOTE — ASSESSMENT & PLAN NOTE
Recommended screening guidelines addressed and orders placed as indicated by age and chronic conditions  Screening labs ordered, will call with results  FIT test ordered  Prevnar 20 today  Continue to work on healthy lifestyle including well balanced diet, regular activity, limit alcohol, no tobacco products   Follow up annually      Orders:    1 Year Follow Up In [facility] - PCP - Wellness Exam; Future

## 2025-03-15 LAB
ALBUMIN/CREAT UR: NORMAL MG/G CREAT
CREAT UR-MCNC: 75 MG/DL (ref 20–320)
MICROALBUMIN UR-MCNC: <0.2 MG/DL

## 2025-03-25 DIAGNOSIS — E11.65 TYPE 2 DIABETES MELLITUS WITH HYPERGLYCEMIA, WITHOUT LONG-TERM CURRENT USE OF INSULIN: Primary | ICD-10-CM

## 2025-03-25 RX ORDER — LOSARTAN POTASSIUM 25 MG/1
25 TABLET ORAL DAILY
Qty: 90 TABLET | Refills: 0 | Status: SHIPPED | OUTPATIENT
Start: 2025-03-25 | End: 2025-06-23

## 2025-04-08 DIAGNOSIS — E11.65 TYPE 2 DIABETES MELLITUS WITH HYPERGLYCEMIA, WITHOUT LONG-TERM CURRENT USE OF INSULIN: Primary | ICD-10-CM

## 2025-04-08 RX ORDER — DAPAGLIFLOZIN 5 MG/1
5 TABLET, FILM COATED ORAL DAILY
Qty: 90 TABLET | Refills: 3 | Status: SHIPPED | OUTPATIENT
Start: 2025-04-08

## 2025-05-11 LAB
CHOLEST SERPL-MCNC: NORMAL MG/DL
CHOLEST/HDLC SERPL: NORMAL {RATIO}
HDLC SERPL-MCNC: NORMAL MG/DL
HEMOCCULT STL QL IA: NORMAL
LDLC SERPL CALC-MCNC: NORMAL MG/DL
NONHDLC SERPL-MCNC: NORMAL MG/DL
TRIGL SERPL-MCNC: NORMAL MG/DL

## 2025-05-12 LAB
CHOLEST SERPL-MCNC: NORMAL MG/DL
CHOLEST/HDLC SERPL: NORMAL (CALC)
HDLC SERPL-MCNC: NORMAL MG/DL
HEMOCCULT STL QL IA: NORMAL
LDLC SERPL CALC-MCNC: NORMAL MG/DL (CALC)
NONHDLC SERPL-MCNC: NORMAL MG/DL (CALC)
TRIGL SERPL-MCNC: NORMAL MG/DL

## 2025-06-03 LAB
ALBUMIN SERPL-MCNC: 4.6 G/DL (ref 3.6–5.1)
BUN SERPL-MCNC: 60 MG/DL (ref 7–25)
BUN/CREAT SERPL: 31 (CALC) (ref 6–22)
CALCIUM SERPL-MCNC: 9.3 MG/DL (ref 8.6–10.3)
CHLORIDE SERPL-SCNC: 106 MMOL/L (ref 98–110)
CO2 SERPL-SCNC: 21 MMOL/L (ref 20–32)
CREAT SERPL-MCNC: 1.94 MG/DL (ref 0.7–1.28)
EGFRCR SERPLBLD CKD-EPI 2021: 35 ML/MIN/1.73M2
ERYTHROCYTE [DISTWIDTH] IN BLOOD BY AUTOMATED COUNT: 12.8 % (ref 11–15)
GLUCOSE SERPL-MCNC: 133 MG/DL (ref 65–99)
HCT VFR BLD AUTO: 45.5 % (ref 38.5–50)
HGB BLD-MCNC: 15.2 G/DL (ref 13.2–17.1)
MCH RBC QN AUTO: 30.7 PG (ref 27–33)
MCHC RBC AUTO-ENTMCNC: 33.4 G/DL (ref 32–36)
MCV RBC AUTO: 91.9 FL (ref 80–100)
PHOSPHATE SERPL-MCNC: 4 MG/DL (ref 2.1–4.3)
PLATELET # BLD AUTO: 215 THOUSAND/UL (ref 140–400)
PMV BLD REES-ECKER: 8.5 FL (ref 7.5–12.5)
POTASSIUM SERPL-SCNC: 4.8 MMOL/L (ref 3.5–5.3)
PTH-INTACT SERPL-MCNC: 55 PG/ML (ref 16–77)
RBC # BLD AUTO: 4.95 MILLION/UL (ref 4.2–5.8)
SODIUM SERPL-SCNC: 136 MMOL/L (ref 135–146)
WBC # BLD AUTO: 5.8 THOUSAND/UL (ref 3.8–10.8)

## 2025-06-05 ENCOUNTER — APPOINTMENT (OUTPATIENT)
Dept: NEPHROLOGY | Facility: CLINIC | Age: 75
End: 2025-06-05
Payer: MEDICARE

## 2025-06-05 VITALS
WEIGHT: 182 LBS | SYSTOLIC BLOOD PRESSURE: 136 MMHG | DIASTOLIC BLOOD PRESSURE: 72 MMHG | HEART RATE: 72 BPM | BODY MASS INDEX: 25.48 KG/M2 | HEIGHT: 71 IN

## 2025-06-05 DIAGNOSIS — N21.0 BLADDER CALCULUS: ICD-10-CM

## 2025-06-05 DIAGNOSIS — N18.4 CKD (CHRONIC KIDNEY DISEASE) STAGE 4, GFR 15-29 ML/MIN (MULTI): Primary | ICD-10-CM

## 2025-06-05 PROCEDURE — 1036F TOBACCO NON-USER: CPT | Performed by: INTERNAL MEDICINE

## 2025-06-05 PROCEDURE — 3075F SYST BP GE 130 - 139MM HG: CPT | Performed by: INTERNAL MEDICINE

## 2025-06-05 PROCEDURE — 1159F MED LIST DOCD IN RCRD: CPT | Performed by: INTERNAL MEDICINE

## 2025-06-05 PROCEDURE — G2211 COMPLEX E/M VISIT ADD ON: HCPCS | Performed by: INTERNAL MEDICINE

## 2025-06-05 PROCEDURE — 3078F DIAST BP <80 MM HG: CPT | Performed by: INTERNAL MEDICINE

## 2025-06-05 PROCEDURE — 99213 OFFICE O/P EST LOW 20 MIN: CPT | Performed by: INTERNAL MEDICINE

## 2025-06-05 NOTE — PATIENT INSTRUCTIONS
I'll follow up on the urine supersaturation and contact you with results.  Continue medications as are  Renal US soon - ordered.  Have a nice summer!  F/u in 6 months.

## 2025-06-05 NOTE — PROGRESS NOTES
" Leonel Mendez is a 74 y.o. male here in follow up for CKD    Subjective   Doing overall well. Sees Dr. Sexton since Dr. Almonte retired. Specifically he denies polyuria, polydipsia, polyphagia, dysuria, hematuria, leg edema. Has not chest pain, dyspnea, leg edema. Appetite is good, gained 3-4 lb. Does a lot of walking for exercise and plans to further increase as the weather gets nicer. Had labs done this month and turned in urine for supersaturation.      ROS  All the rest reviewed and negative    Objective     Vital signs  /72 (BP Location: Right arm, Patient Position: Sitting)   Pulse 72   Ht 1.803 m (5' 11\")   Wt 82.6 kg (182 lb)   BMI 25.38 kg/m²       Physical Exam  Constitutional:  well appearing, in NAD  Psychiatric:  appropriate mood and behavior    HEENT: NC/AT, clear sclerae, moist mucous membranes  Cardiovascular: normal S1, S2, RRR,  no murmurs, gallop or rubs  Pulmonary:  Normal breath sounds  Extremities: no edema  Skin:  warm and dry    Meds  Current Medications[1]     Allergies  RX Allergies[2]     Results  Recent Results (from the past week)   Renal Function Panel    Collection Time: 06/02/25  3:17 PM   Result Value Ref Range    GLUCOSE 133 (H) 65 - 99 mg/dL    UREA NITROGEN (BUN) 60 (H) 7 - 25 mg/dL    CREATININE 1.94 (H) 0.70 - 1.28 mg/dL    EGFR 35 (L) > OR = 60 mL/min/1.73m2    BUN/CREATININE RATIO 31 (H) 6 - 22 (calc)    SODIUM 136 135 - 146 mmol/L    POTASSIUM 4.8 3.5 - 5.3 mmol/L    CHLORIDE 106 98 - 110 mmol/L    CARBON DIOXIDE 21 20 - 32 mmol/L    CALCIUM 9.3 8.6 - 10.3 mg/dL    PHOSPHATE (AS PHOSPHORUS) 4.0 2.1 - 4.3 mg/dL    ALBUMIN 4.6 3.6 - 5.1 g/dL   CBC    Collection Time: 06/02/25  3:17 PM   Result Value Ref Range    WHITE BLOOD CELL COUNT 5.8 3.8 - 10.8 Thousand/uL    RED BLOOD CELL COUNT 4.95 4.20 - 5.80 Million/uL    HEMOGLOBIN 15.2 13.2 - 17.1 g/dL    HEMATOCRIT 45.5 38.5 - 50.0 %    MCV 91.9 80.0 - 100.0 fL    MCH 30.7 27.0 - 33.0 pg    MCHC 33.4 32.0 - 36.0 g/dL    " RDW 12.8 11.0 - 15.0 %    PLATELET COUNT 215 140 - 400 Thousand/uL    MPV 8.5 7.5 - 12.5 fL   Parathyroid Hormone, Intact    Collection Time: 06/02/25  3:17 PM   Result Value Ref Range    PARATHYROID HORMONE, INTACT 55 16 - 77 pg/mL         Imaging results      Assessment and Plan    1. CKD stage G3b-4/A1, presumed to represent past obstructive nephropathy +/- hyperoxaluria. Diabetic kidney disease unlikely. With stable/mildly improved s.creatinine and in the absence of proteinuria we're withholding renal biopsy. Labs this month stable. Will continue with blood and urine tests to every 6 months. Continue medications as are. Kidney US ordered in October, but not scheduled. Reordered today.     2. H/o bladder stones. This month's supersaturation pending. Meanwhile  continue large amount of fluid intake, crystal light lemonade as able, dietary restrictions as already doing (low Na, low sugar, low oxalate, adequate dietary calcium, less animal protein, increase protein from plants and vegetables.  Repeat supersaturation along with routine blood and urine chemistries in 6 months.     3. HTN - BP at goal in the office today. Continue current antihypertensive regimen.      RTC in f/u in 6 -8 months.    Minerva Martin MD         [1]   Current Outpatient Medications:     cholecalciferol (Vitamin D-3) 25 MCG (1000 UT) tablet, Take 1 tablet (25 mcg) by mouth once daily., Disp: , Rfl:     cranberry conc-C-bacillus coag (Cranberry Urinary Tract Health) 250-30-3.5 mg tablet, Take 1 tablet by mouth 2 times a day., Disp: , Rfl:     Farxiga 5 mg tablet, Take 1 tablet (5 mg) by mouth once daily., Disp: 90 tablet, Rfl: 3    losartan (Cozaar) 25 mg tablet, Take 1 tablet (25 mg) by mouth once daily., Disp: 90 tablet, Rfl: 0    pravastatin (Pravachol) 40 mg tablet, Take 1 tablet (40 mg) by mouth once daily at bedtime., Disp: , Rfl:     aspirin 81 mg capsule, Take 81 mg by mouth once daily. (Patient not taking: Reported on 6/5/2025),  Disp: , Rfl:     tamsulosin (Flomax) 0.4 mg 24 hr capsule, Take 1 capsule (0.4 mg) by mouth once daily. (Patient not taking: Reported on 6/5/2025), Disp: , Rfl:   [2]   Allergies  Allergen Reactions    Erythromycin Hives    Pollen Extracts Unknown

## 2025-06-11 LAB
AMMONIA 24H UR-SRATE: 24 MEQ/DAY (ref 14–62)
CA H2 PHOS DIHYD 24H SATFR UR: 0.03
CALCIUM 24H UR-MRATE: 43 MG/DAY
CAOX INDEX 24H UR-RTO: 0.21
CITRATE 24H UR-MRATE: 230 MG/DAY
CREAT 24H UR-MRATE: 1176 MG/DAY (ref 800–2000)
MAGNESIUM 24H UR-MRATE: 162 MG/DAY
NA URATE 24H SATFR UR: 0.33
OXALATE 24H UR-MRATE: 34 MG/DAY
PATIENT HX: ABNORMAL
PH 24H UR: 5 [PH] (ref 5.5–7)
PHOSPHATE 24H UR-MRATE: 1071 MG/DAY
POTASSIUM 24H UR-SRATE: 135 MEQ/DAY (ref 19–135)
QUEST SUPERSATURATION INDEX WITH RESPECT TO:: ABNORMAL
QUEST SUSPECTED PROBLEM IS:: ABNORMAL
SODIUM 24H UR-SRATE: 101 MEQ/DAY
SPECIMEN VOL 24H UR: 2.44 L/DAY
SULFATE 24H UR-SRATE: 32 MMOL/DAY
TRI-PHOS 24H SATFR UR: 0
URATE 24H SATFR UR: 4.64
URATE 24H UR-MRATE: 734 MG/DAY

## 2025-06-18 ENCOUNTER — PATIENT MESSAGE (OUTPATIENT)
Dept: NEPHROLOGY | Facility: CLINIC | Age: 75
End: 2025-06-18
Payer: MEDICARE

## 2025-06-21 DIAGNOSIS — E11.65 TYPE 2 DIABETES MELLITUS WITH HYPERGLYCEMIA, WITHOUT LONG-TERM CURRENT USE OF INSULIN: ICD-10-CM

## 2025-06-23 RX ORDER — LOSARTAN POTASSIUM 25 MG/1
25 TABLET ORAL DAILY
Qty: 90 TABLET | Refills: 0 | Status: SHIPPED | OUTPATIENT
Start: 2025-06-23

## 2025-06-24 ASSESSMENT — ENCOUNTER SYMPTOMS
BLACKOUTS: 0
NERVOUS/ANXIOUS: 0
CONFUSION: 0
FATIGUE: 0
WEIGHT LOSS: 0
DIZZINESS: 0
HUNGER: 0
SPEECH DIFFICULTY: 0
POLYDIPSIA: 0
TREMORS: 0
HEADACHES: 0
VISUAL CHANGE: 0
POLYPHAGIA: 0
SEIZURES: 0
SWEATS: 0
WEAKNESS: 0
BLURRED VISION: 0

## 2025-06-25 ENCOUNTER — APPOINTMENT (OUTPATIENT)
Dept: RADIOLOGY | Facility: HOSPITAL | Age: 75
End: 2025-06-25
Payer: MEDICARE

## 2025-06-25 DIAGNOSIS — N18.4 CKD (CHRONIC KIDNEY DISEASE) STAGE 4, GFR 15-29 ML/MIN (MULTI): ICD-10-CM

## 2025-06-25 DIAGNOSIS — N20.0 NEPHROLITHIASIS: ICD-10-CM

## 2025-06-25 PROCEDURE — 76770 US EXAM ABDO BACK WALL COMP: CPT | Performed by: RADIOLOGY

## 2025-06-25 PROCEDURE — 76770 US EXAM ABDO BACK WALL COMP: CPT

## 2025-06-30 PROBLEM — N21.0 BLADDER CALCULUS: Status: RESOLVED | Noted: 2024-12-13 | Resolved: 2025-06-30

## 2025-06-30 PROBLEM — N13.8 BPH WITH OBSTRUCTION/LOWER URINARY TRACT SYMPTOMS: Status: RESOLVED | Noted: 2024-12-13 | Resolved: 2025-06-30

## 2025-06-30 PROBLEM — N40.2 PROSTATE NODULE: Status: RESOLVED | Noted: 2024-12-13 | Resolved: 2025-06-30

## 2025-06-30 PROBLEM — N40.1 BPH WITH OBSTRUCTION/LOWER URINARY TRACT SYMPTOMS: Status: RESOLVED | Noted: 2024-12-13 | Resolved: 2025-06-30

## 2025-07-01 ENCOUNTER — APPOINTMENT (OUTPATIENT)
Dept: PRIMARY CARE | Facility: CLINIC | Age: 75
End: 2025-07-01
Payer: MEDICARE

## 2025-07-01 VITALS
SYSTOLIC BLOOD PRESSURE: 112 MMHG | DIASTOLIC BLOOD PRESSURE: 76 MMHG | BODY MASS INDEX: 25.15 KG/M2 | WEIGHT: 179.6 LBS | HEART RATE: 74 BPM | HEIGHT: 71 IN | TEMPERATURE: 97.4 F | OXYGEN SATURATION: 96 %

## 2025-07-01 DIAGNOSIS — I10 BENIGN ESSENTIAL HYPERTENSION: ICD-10-CM

## 2025-07-01 DIAGNOSIS — N18.4 CKD (CHRONIC KIDNEY DISEASE) STAGE 4, GFR 15-29 ML/MIN (MULTI): ICD-10-CM

## 2025-07-01 DIAGNOSIS — L30.9 DERMATITIS: ICD-10-CM

## 2025-07-01 DIAGNOSIS — E11.65 TYPE 2 DIABETES MELLITUS WITH HYPERGLYCEMIA, WITHOUT LONG-TERM CURRENT USE OF INSULIN: Primary | ICD-10-CM

## 2025-07-01 PROBLEM — Z00.00 ROUTINE GENERAL MEDICAL EXAMINATION AT HEALTH CARE FACILITY: Status: RESOLVED | Noted: 2025-03-14 | Resolved: 2025-07-01

## 2025-07-01 LAB — POC HEMOGLOBIN A1C: 5.9 % (ref 4.2–6.5)

## 2025-07-01 PROCEDURE — 1159F MED LIST DOCD IN RCRD: CPT | Performed by: FAMILY MEDICINE

## 2025-07-01 PROCEDURE — 83036 HEMOGLOBIN GLYCOSYLATED A1C: CPT | Performed by: FAMILY MEDICINE

## 2025-07-01 PROCEDURE — 3078F DIAST BP <80 MM HG: CPT | Performed by: FAMILY MEDICINE

## 2025-07-01 PROCEDURE — G2211 COMPLEX E/M VISIT ADD ON: HCPCS | Performed by: FAMILY MEDICINE

## 2025-07-01 PROCEDURE — 1036F TOBACCO NON-USER: CPT | Performed by: FAMILY MEDICINE

## 2025-07-01 PROCEDURE — 1160F RVW MEDS BY RX/DR IN RCRD: CPT | Performed by: FAMILY MEDICINE

## 2025-07-01 PROCEDURE — 3074F SYST BP LT 130 MM HG: CPT | Performed by: FAMILY MEDICINE

## 2025-07-01 PROCEDURE — 99214 OFFICE O/P EST MOD 30 MIN: CPT | Performed by: FAMILY MEDICINE

## 2025-07-01 PROCEDURE — 1126F AMNT PAIN NOTED NONE PRSNT: CPT | Performed by: FAMILY MEDICINE

## 2025-07-01 RX ORDER — TRIAMCINOLONE ACETONIDE 1 MG/G
OINTMENT TOPICAL 2 TIMES DAILY PRN
Qty: 15 G | Refills: 0 | Status: SHIPPED | OUTPATIENT
Start: 2025-07-01 | End: 2025-07-11

## 2025-07-01 ASSESSMENT — ENCOUNTER SYMPTOMS
SPEECH DIFFICULTY: 0
WEIGHT LOSS: 0
BLACKOUTS: 0
VISUAL CHANGE: 0
POLYDIPSIA: 0
BLURRED VISION: 0
SWEATS: 0
DIZZINESS: 0
TREMORS: 0
HUNGER: 0
CONFUSION: 0
SEIZURES: 0
WEAKNESS: 0
NERVOUS/ANXIOUS: 0
HEADACHES: 0
POLYPHAGIA: 0
FATIGUE: 0

## 2025-07-01 ASSESSMENT — PAIN SCALES - GENERAL: PAINLEVEL_OUTOF10: 0-NO PAIN

## 2025-07-01 NOTE — PROGRESS NOTES
Subjective   Patient ID: Leonel Mendez is a 75 y.o. male who presents for Diabetes and Follow-up.    Diabetes  He has type 2 diabetes mellitus. No MedicAlert identification noted. The initial diagnosis of diabetes was made 6 years ago. Pertinent negatives for hypoglycemia include no confusion, dizziness, headaches, hunger, mood changes, nervousness/anxiousness, pallor, seizures, sleepiness, speech difficulty, sweats or tremors. Associated symptoms include foot paresthesias. Pertinent negatives for diabetes include no blurred vision, no chest pain, no fatigue, no foot ulcerations, no polydipsia, no polyphagia, no polyuria, no visual change, no weakness and no weight loss. Pertinent negatives for hypoglycemia complications include no blackouts, no hospitalization, no nocturnal hypoglycemia, no required assistance and no required glucagon injection. Symptoms are stable. Diabetic complications include nephropathy and peripheral neuropathy. Pertinent negatives for diabetic complications include no CVA, heart disease, impotence, PVD or retinopathy. There are no known risk factors for coronary artery disease. Current diabetic treatment includes diet and oral agent (dual therapy). He is compliant with treatment all of the time. His weight is stable. He is following a diabetic, generally healthy, high fiber, low fat/cholesterol and low salt diet. Meal planning includes avoidance of concentrated sweets and carbohydrate counting. He has not had a previous visit with a dietitian. He participates in exercise every other day. There is no compliance with monitoring of blood glucose. There is no change in his home blood glucose trend. His breakfast blood glucose is taken after 10 am. His lunch blood glucose is taken between 2-3 pm. His dinner blood glucose is taken after 8 pm. His bedtime blood glucose is taken after 11 pm. He does not see a podiatrist.Eye exam is current.        Review of Systems   Constitutional:  Negative for  "fatigue and weight loss.   Eyes:  Negative for blurred vision.   Cardiovascular:  Negative for chest pain.   Endocrine: Negative for polydipsia, polyphagia and polyuria.   Genitourinary:  Negative for impotence.   Skin:  Negative for pallor.   Neurological:  Negative for dizziness, tremors, seizures, speech difficulty, weakness and headaches.   Psychiatric/Behavioral:  Negative for confusion. The patient is not nervous/anxious.        Objective   /76 (BP Location: Left arm, Patient Position: Sitting, BP Cuff Size: Adult)   Pulse 74   Temp 36.3 °C (97.4 °F) (Temporal)   Ht 1.803 m (5' 11\")   Wt 81.5 kg (179 lb 9.6 oz)   SpO2 96%   BMI 25.05 kg/m²     Physical Exam  Constitutional:       Appearance: He is normal weight.   Eyes:      Extraocular Movements: Extraocular movements intact.      Pupils: Pupils are equal, round, and reactive to light.   Cardiovascular:      Rate and Rhythm: Normal rate and regular rhythm.   Pulmonary:      Effort: Pulmonary effort is normal.      Breath sounds: Normal breath sounds.   Skin:     Findings: Rash (left distal shin there is an erythematous nummular lesion, dry skin, measures about 1cm) present.   Neurological:      General: No focal deficit present.      Mental Status: He is alert and oriented to person, place, and time.   Psychiatric:         Mood and Affect: Mood normal.         Assessment/Plan   Problem List Items Addressed This Visit           ICD-10-CM    CKD (chronic kidney disease) stage 4, GFR 15-29 ml/min (Multi) N18.4    Follows with nephrology, up to date on visit         Type 2 diabetes mellitus with hyperglycemia (Multi) - Primary E11.65    Good control, A1C at 5.9  Continue on Farxiga, remain active and follow diet  Follow up in 6 months         Relevant Orders    POCT glycosylated hemoglobin (Hb A1C) manually resulted (Completed)    Benign essential hypertension I10    Well controlled  Continue current regimen  Labs up to date         Dermatitis L30.9 "    Suspect nummular eczema  If symptoms fail to improve with Triamcinolone then will recommend dermatology         Relevant Medications    triamcinolone (Kenalog) 0.1 % ointment

## 2025-07-01 NOTE — ASSESSMENT & PLAN NOTE
Suspect nummular eczema  If symptoms fail to improve with Triamcinolone then will recommend dermatology

## 2025-07-01 NOTE — ASSESSMENT & PLAN NOTE
Good control, A1C at 5.9  Continue on Farxiga, remain active and follow diet  Follow up in 6 months

## 2025-08-01 ENCOUNTER — TELEPHONE (OUTPATIENT)
Dept: PRIMARY CARE | Facility: CLINIC | Age: 75
End: 2025-08-01
Payer: MEDICARE

## 2025-08-01 NOTE — TELEPHONE ENCOUNTER
Pt LVM stating that the lesion on his leg did not go away after using the steroid on it. He would like to know if you want to put in the referral for dermatology.

## 2025-12-18 ENCOUNTER — APPOINTMENT (OUTPATIENT)
Dept: NEPHROLOGY | Facility: CLINIC | Age: 75
End: 2025-12-18
Payer: MEDICARE

## 2026-01-06 ENCOUNTER — APPOINTMENT (OUTPATIENT)
Dept: PRIMARY CARE | Facility: CLINIC | Age: 76
End: 2026-01-06
Payer: MEDICARE